# Patient Record
Sex: FEMALE | Race: WHITE | Employment: PART TIME | URBAN - METROPOLITAN AREA
[De-identification: names, ages, dates, MRNs, and addresses within clinical notes are randomized per-mention and may not be internally consistent; named-entity substitution may affect disease eponyms.]

---

## 2019-09-23 ENCOUNTER — OFFICE VISIT (OUTPATIENT)
Dept: FAMILY MEDICINE CLINIC | Facility: CLINIC | Age: 34
End: 2019-09-23
Payer: COMMERCIAL

## 2019-09-23 ENCOUNTER — APPOINTMENT (OUTPATIENT)
Dept: RADIOLOGY | Facility: CLINIC | Age: 34
End: 2019-09-23
Payer: COMMERCIAL

## 2019-09-23 VITALS
HEIGHT: 60 IN | WEIGHT: 122.2 LBS | DIASTOLIC BLOOD PRESSURE: 76 MMHG | OXYGEN SATURATION: 98 % | RESPIRATION RATE: 16 BRPM | SYSTOLIC BLOOD PRESSURE: 116 MMHG | TEMPERATURE: 96.8 F | HEART RATE: 65 BPM | BODY MASS INDEX: 23.99 KG/M2

## 2019-09-23 DIAGNOSIS — R07.89 OTHER CHEST PAIN: ICD-10-CM

## 2019-09-23 DIAGNOSIS — R53.82 CHRONIC FATIGUE: ICD-10-CM

## 2019-09-23 DIAGNOSIS — R07.89 OTHER CHEST PAIN: Primary | ICD-10-CM

## 2019-09-23 DIAGNOSIS — Z13.6 SCREENING FOR CARDIOVASCULAR CONDITION: ICD-10-CM

## 2019-09-23 DIAGNOSIS — Z98.890 HX OF BREAST SURGERY: ICD-10-CM

## 2019-09-23 DIAGNOSIS — R00.2 PALPITATION: ICD-10-CM

## 2019-09-23 DIAGNOSIS — R42 VERTIGO: ICD-10-CM

## 2019-09-23 DIAGNOSIS — F41.9 ANXIETY: ICD-10-CM

## 2019-09-23 PROCEDURE — 3008F BODY MASS INDEX DOCD: CPT | Performed by: FAMILY MEDICINE

## 2019-09-23 PROCEDURE — 71046 X-RAY EXAM CHEST 2 VIEWS: CPT

## 2019-09-23 PROCEDURE — 99204 OFFICE O/P NEW MOD 45 MIN: CPT | Performed by: FAMILY MEDICINE

## 2019-09-23 RX ORDER — ACYCLOVIR 50 MG/G
CREAM TOPICAL
COMMUNITY
Start: 2017-04-11 | End: 2019-09-23 | Stop reason: ALTCHOICE

## 2019-09-23 RX ORDER — MECLIZINE HYDROCHLORIDE 25 MG/1
TABLET ORAL 3 TIMES DAILY
COMMUNITY
Start: 2017-10-19 | End: 2019-09-23 | Stop reason: ALTCHOICE

## 2019-09-23 RX ORDER — VALACYCLOVIR HYDROCHLORIDE 1 G/1
TABLET, FILM COATED ORAL EVERY 12 HOURS
COMMUNITY
Start: 2017-04-11 | End: 2019-09-23 | Stop reason: ALTCHOICE

## 2019-09-23 NOTE — PROGRESS NOTES
Rell Angeles 1985 female MRN: [id number]    Family Medicine New Patient     ASSESSMENT/PLAN  Problem List Items Addressed This Visit        Other    Other chest pain - Primary    Relevant Orders    XR chest pa & lateral    Holter monitor - 24 hour    Comprehensive metabolic panel    CBC and differential    TSH, 3rd generation with Free T4 reflex    Lipid panel    MICHELLE Screen w/ Reflex to Titer/Pattern    Lyme Antibody Profile with reflex to WB    UA (URINE) with reflex to Microscopic    Palpitation    Relevant Orders    XR chest pa & lateral    Holter monitor - 24 hour    Comprehensive metabolic panel    CBC and differential    TSH, 3rd generation with Free T4 reflex    Lipid panel    MICHELLE Screen w/ Reflex to Titer/Pattern    Lyme Antibody Profile with reflex to WB    UA (URINE) with reflex to Microscopic    Screening for cardiovascular condition    Relevant Orders    Comprehensive metabolic panel    CBC and differential    TSH, 3rd generation with Free T4 reflex    Lipid panel    MICHELLE Screen w/ Reflex to Titer/Pattern    Lyme Antibody Profile with reflex to WB    UA (URINE) with reflex to Microscopic    Hx of breast surgery    Vertigo     States this has been well controlled  She did have a normal MRI in the work up of this          Chronic fatigue    Relevant Orders    Comprehensive metabolic panel    CBC and differential    TSH, 3rd generation with Free T4 reflex    Lipid panel    MICHELLE Screen w/ Reflex to Titer/Pattern    Lyme Antibody Profile with reflex to WB    UA (URINE) with reflex to Microscopic    Anxiety     Using medical marijuana for this currently  Does not want to take "medication" at this time          Relevant Orders    Comprehensive metabolic panel    CBC and differential    TSH, 3rd generation with Free T4 reflex    Lipid panel    MICHELLE Screen w/ Reflex to Titer/Pattern    Lyme Antibody Profile with reflex to WB    UA (URINE) with reflex to Microscopic          Above testing as ordered   Follow up in 1 month to review  Future Appointments   Date Time Provider Qi Dillon   10/21/2019 11:00 AM DO BL[facility]          SUBJECTIVE  CC: Establish Care and Chest Pain (for 2 months-pt states it comes and goes )      HPI:   Wilmer Helm is a 29 y o  female who presents for new patient visit  Works as a dental hygienist, going to school, has 2 small kids    PMH:denies  PSH: breast augmentation 5/2019, groin hernia repair age 9    Social: denies tobacco, medical THC (to help with anxiety)    Complaints: chest pains, palpitation     HPI    Review of Systems   Constitutional: Negative for chills, fatigue and fever  HENT: Negative for congestion, postnasal drip, rhinorrhea and sinus pressure  Eyes: Negative for photophobia and visual disturbance  Respiratory: Negative for cough and shortness of breath  Cardiovascular: Positive for chest pain and palpitations  Negative for leg swelling  Gastrointestinal: Negative for abdominal pain, constipation, diarrhea, nausea and vomiting  Genitourinary: Negative for difficulty urinating and dysuria  Musculoskeletal: Negative for arthralgias and myalgias  Skin: Negative for color change and rash  Neurological: Negative for dizziness, weakness, light-headedness and headaches  Historical Information   The patient history was reviewed as follows:    History reviewed  No pertinent past medical history  History reviewed  No pertinent surgical history  History reviewed  No pertinent family history  Social History   Social History     Substance and Sexual Activity   Alcohol Use Yes    Comment: ocass     Social History     Substance and Sexual Activity   Drug Use Yes    Types: Marijuana     Social History     Tobacco Use   Smoking Status Never Smoker   Smokeless Tobacco Never Used       Medications:   No current outpatient medications on file    No Known Allergies    OBJECTIVE    Vitals:   Vitals:    09/23/19 1107   BP: 116/76 BP Location: Right arm   Patient Position: Sitting   Cuff Size: Standard   Pulse: 65   Resp: 16   Temp: (!) 96 8 °F (36 °C)   TempSrc: Tympanic   SpO2: 98%   Weight: 55 4 kg (122 lb 3 2 oz)   Height: 4' 11 5" (1 511 m)           Physical Exam   Constitutional: She is oriented to person, place, and time  She appears well-developed and well-nourished  HENT:   Head: Normocephalic and atraumatic  Mouth/Throat: Oropharynx is clear and moist    Eyes: Pupils are equal, round, and reactive to light  Neck: Normal range of motion  Neck supple  Cardiovascular: Normal rate, regular rhythm and normal heart sounds  Pulmonary/Chest: Effort normal and breath sounds normal  No respiratory distress  She has no wheezes  Abdominal: Soft  Bowel sounds are normal  She exhibits no distension  There is no tenderness  Musculoskeletal: Normal range of motion  She exhibits no edema or tenderness  Neurological: She is alert and oriented to person, place, and time  Skin: Skin is warm and dry  Psychiatric: She has a normal mood and affect   Her behavior is normal               Josafat Miranda DO    9/23/2019

## 2019-10-04 ENCOUNTER — HOSPITAL ENCOUNTER (OUTPATIENT)
Dept: NON INVASIVE DIAGNOSTICS | Facility: CLINIC | Age: 34
Discharge: HOME | End: 2019-10-04
Payer: COMMERCIAL

## 2019-10-04 DIAGNOSIS — R07.89 OTHER CHEST PAIN: ICD-10-CM

## 2019-10-04 DIAGNOSIS — R00.2 PALPITATION: ICD-10-CM

## 2019-10-04 PROCEDURE — 93225 XTRNL ECG REC<48 HRS REC: CPT

## 2019-10-04 PROCEDURE — 93226 XTRNL ECG REC<48 HR SCAN A/R: CPT

## 2019-10-09 PROCEDURE — 93227 XTRNL ECG REC<48 HR R&I: CPT | Performed by: INTERNAL MEDICINE
